# Patient Record
Sex: FEMALE | Race: WHITE | NOT HISPANIC OR LATINO | Employment: OTHER | URBAN - NONMETROPOLITAN AREA
[De-identification: names, ages, dates, MRNs, and addresses within clinical notes are randomized per-mention and may not be internally consistent; named-entity substitution may affect disease eponyms.]

---

## 2021-01-07 ENCOUNTER — LAB VISIT (OUTPATIENT)
Dept: LAB | Facility: HOSPITAL | Age: 71
End: 2021-01-07
Attending: SURGERY
Payer: MEDICARE

## 2021-01-07 DIAGNOSIS — T81.43XA POSTOPERATIVE INTRA-ABDOMINAL ABSCESS: Primary | ICD-10-CM

## 2021-01-07 DIAGNOSIS — I70.213 ATHEROSCLEROSIS OF NATIVE ARTERY OF BOTH LOWER EXTREMITIES WITH INTERMITTENT CLAUDICATION: ICD-10-CM

## 2021-01-07 DIAGNOSIS — G83.9 PARESIS: ICD-10-CM

## 2021-01-07 LAB
ANION GAP SERPL CALC-SCNC: 7 MMOL/L (ref 8–16)
BUN SERPL-MCNC: 8 MG/DL (ref 8–23)
CALCIUM SERPL-MCNC: 7.5 MG/DL (ref 8.7–10.5)
CHLORIDE SERPL-SCNC: 101 MMOL/L (ref 95–110)
CO2 SERPL-SCNC: 27 MMOL/L (ref 23–29)
CREAT SERPL-MCNC: 0.4 MG/DL (ref 0.5–1.4)
EST. GFR  (AFRICAN AMERICAN): >60 ML/MIN/1.73 M^2
EST. GFR  (NON AFRICAN AMERICAN): >60 ML/MIN/1.73 M^2
GLUCOSE SERPL-MCNC: 118 MG/DL (ref 70–110)
POTASSIUM SERPL-SCNC: 3.5 MMOL/L (ref 3.5–5.1)
SODIUM SERPL-SCNC: 135 MMOL/L (ref 136–145)

## 2021-01-07 PROCEDURE — 36415 COLL VENOUS BLD VENIPUNCTURE: CPT

## 2021-01-07 PROCEDURE — 80048 BASIC METABOLIC PNL TOTAL CA: CPT

## 2021-05-27 ENCOUNTER — LAB VISIT (OUTPATIENT)
Dept: LAB | Facility: HOSPITAL | Age: 71
End: 2021-05-27
Attending: SURGERY
Payer: MEDICARE

## 2021-05-27 DIAGNOSIS — R19.7 DIARRHEA: Primary | ICD-10-CM

## 2021-05-27 PROCEDURE — 87324 CLOSTRIDIUM AG IA: CPT | Performed by: SURGERY

## 2021-05-27 PROCEDURE — 87449 NOS EACH ORGANISM AG IA: CPT | Performed by: SURGERY

## 2021-05-28 LAB
C DIFF GDH STL QL: NEGATIVE
C DIFF TOX A+B STL QL IA: NEGATIVE

## 2021-06-14 ENCOUNTER — OFFICE VISIT (OUTPATIENT)
Dept: WOUND CARE | Facility: HOSPITAL | Age: 71
End: 2021-06-14
Attending: SURGERY
Payer: MEDICARE

## 2021-06-14 VITALS
DIASTOLIC BLOOD PRESSURE: 54 MMHG | HEART RATE: 84 BPM | SYSTOLIC BLOOD PRESSURE: 101 MMHG | TEMPERATURE: 100 F | RESPIRATION RATE: 20 BRPM

## 2021-06-14 DIAGNOSIS — L89.890: ICD-10-CM

## 2021-06-14 DIAGNOSIS — L89.890 DECUBITUS ULCER OF LEFT LEG, UNSTAGEABLE: ICD-10-CM

## 2021-06-14 DIAGNOSIS — L89.610 DECUBITUS ULCER OF RIGHT HEEL, UNSTAGEABLE: ICD-10-CM

## 2021-06-14 DIAGNOSIS — L89.893: ICD-10-CM

## 2021-06-14 DIAGNOSIS — L89.324 DECUBITUS ULCER OF ISCHIUM, LEFT, STAGE IV: ICD-10-CM

## 2021-06-14 DIAGNOSIS — L89.314 DECUBITUS ULCER OF RIGHT ISCHIAL AREA, STAGE IV: ICD-10-CM

## 2021-06-14 DIAGNOSIS — L89.154 SACRAL DECUBITUS ULCER, STAGE IV: ICD-10-CM

## 2021-06-14 DIAGNOSIS — L89.620 DECUBITUS ULCER OF LEFT HEEL, UNSTAGEABLE: ICD-10-CM

## 2021-06-14 PROBLEM — L97.529: Status: ACTIVE | Noted: 2021-06-14

## 2021-06-14 PROCEDURE — 11043 DBRDMT MUSC&/FSCA 1ST 20/<: CPT

## 2021-06-14 PROCEDURE — 27201912 HC WOUND CARE DEBRIDEMENT SUPPLIES

## 2021-06-14 PROCEDURE — 11042 DBRDMT SUBQ TIS 1ST 20SQCM/<: CPT

## 2021-06-14 PROCEDURE — 11045 DBRDMT SUBQ TISS EACH ADDL: CPT

## 2021-06-14 PROCEDURE — 99499 NO LOS: ICD-10-PCS | Mod: ,,, | Performed by: SURGERY

## 2021-06-14 PROCEDURE — 11046 DBRDMT MUSC&/FSCA EA ADDL: CPT

## 2021-06-14 PROCEDURE — 99499 UNLISTED E&M SERVICE: CPT | Mod: ,,, | Performed by: SURGERY

## 2021-06-14 PROCEDURE — 99204 OFFICE O/P NEW MOD 45 MIN: CPT | Mod: 25

## 2021-06-14 RX ORDER — ALPRAZOLAM 0.25 MG/1
TABLET ORAL 3 TIMES DAILY
COMMUNITY

## 2021-06-14 RX ORDER — ATORVASTATIN CALCIUM 40 MG/1
40 TABLET, FILM COATED ORAL DAILY
COMMUNITY

## 2021-06-14 RX ORDER — CLOPIDOGREL BISULFATE 75 MG/1
75 TABLET ORAL DAILY
COMMUNITY

## 2021-06-14 RX ORDER — ASPIRIN 81 MG/1
81 TABLET ORAL DAILY
COMMUNITY

## 2021-06-14 RX ORDER — HYOSCYAMINE SULFATE 0.12 MG/1
0.12 TABLET SUBLINGUAL EVERY 4 HOURS PRN
COMMUNITY

## 2021-06-14 RX ORDER — HYDROMORPHONE HYDROCHLORIDE 2 MG/1
2 TABLET ORAL EVERY 4 HOURS PRN
COMMUNITY

## 2021-06-14 RX ORDER — HYDROCODONE BITARTRATE AND ACETAMINOPHEN 7.5; 325 MG/1; MG/1
1 TABLET ORAL EVERY 6 HOURS PRN
COMMUNITY

## 2021-06-14 RX ORDER — PANTOPRAZOLE SODIUM 40 MG/1
40 TABLET, DELAYED RELEASE ORAL DAILY
COMMUNITY

## 2021-06-14 RX ORDER — METHOCARBAMOL 750 MG/1
500 TABLET, FILM COATED ORAL 4 TIMES DAILY
COMMUNITY

## 2021-06-14 RX ORDER — ASCORBIC ACID 500 MG
500 TABLET ORAL 2 TIMES DAILY
COMMUNITY

## 2021-06-14 RX ORDER — GABAPENTIN 300 MG/1
300 CAPSULE ORAL 3 TIMES DAILY
COMMUNITY

## 2021-06-14 RX ORDER — METOPROLOL SUCCINATE 25 MG/1
25 TABLET, EXTENDED RELEASE ORAL DAILY
COMMUNITY

## 2021-06-14 RX ORDER — CILOSTAZOL 100 MG/1
50 TABLET ORAL 2 TIMES DAILY
COMMUNITY

## 2021-06-28 ENCOUNTER — OFFICE VISIT (OUTPATIENT)
Dept: WOUND CARE | Facility: HOSPITAL | Age: 71
End: 2021-06-28
Attending: SURGERY
Payer: MEDICARE

## 2021-06-28 VITALS
HEART RATE: 99 BPM | SYSTOLIC BLOOD PRESSURE: 111 MMHG | RESPIRATION RATE: 18 BRPM | TEMPERATURE: 98 F | DIASTOLIC BLOOD PRESSURE: 54 MMHG

## 2021-06-28 DIAGNOSIS — L89.890: ICD-10-CM

## 2021-06-28 DIAGNOSIS — L89.324 DECUBITUS ULCER OF ISCHIUM, LEFT, STAGE IV: ICD-10-CM

## 2021-06-28 DIAGNOSIS — L89.620 DECUBITUS ULCER OF LEFT HEEL, UNSTAGEABLE: ICD-10-CM

## 2021-06-28 DIAGNOSIS — L89.314 DECUBITUS ULCER OF RIGHT ISCHIAL AREA, STAGE IV: ICD-10-CM

## 2021-06-28 DIAGNOSIS — L89.893: ICD-10-CM

## 2021-06-28 DIAGNOSIS — L89.154 SACRAL DECUBITUS ULCER, STAGE IV: ICD-10-CM

## 2021-06-28 DIAGNOSIS — L89.890 DECUBITUS ULCER OF LEFT LEG, UNSTAGEABLE: ICD-10-CM

## 2021-06-28 PROCEDURE — 99499 NO LOS: ICD-10-PCS | Mod: ,,, | Performed by: SURGERY

## 2021-06-28 PROCEDURE — 11042 DBRDMT SUBQ TIS 1ST 20SQCM/<: CPT

## 2021-06-28 PROCEDURE — 99499 UNLISTED E&M SERVICE: CPT | Mod: ,,, | Performed by: SURGERY

## 2021-06-28 PROCEDURE — 11043 DBRDMT MUSC&/FSCA 1ST 20/<: CPT

## 2021-06-28 PROCEDURE — 11046 DBRDMT MUSC&/FSCA EA ADDL: CPT

## 2021-06-28 PROCEDURE — 27201912 HC WOUND CARE DEBRIDEMENT SUPPLIES

## 2021-07-12 ENCOUNTER — OFFICE VISIT (OUTPATIENT)
Dept: WOUND CARE | Facility: HOSPITAL | Age: 71
End: 2021-07-12
Attending: SURGERY
Payer: MEDICARE

## 2021-07-12 VITALS
TEMPERATURE: 98 F | HEART RATE: 72 BPM | SYSTOLIC BLOOD PRESSURE: 105 MMHG | RESPIRATION RATE: 18 BRPM | DIASTOLIC BLOOD PRESSURE: 50 MMHG

## 2021-07-12 DIAGNOSIS — L89.890: ICD-10-CM

## 2021-07-12 DIAGNOSIS — L89.314 DECUBITUS ULCER OF RIGHT ISCHIAL AREA, STAGE IV: ICD-10-CM

## 2021-07-12 DIAGNOSIS — L89.620 DECUBITUS ULCER OF LEFT HEEL, UNSTAGEABLE: ICD-10-CM

## 2021-07-12 DIAGNOSIS — E43 SEVERE PROTEIN-CALORIE MALNUTRITION: ICD-10-CM

## 2021-07-12 DIAGNOSIS — L89.894: ICD-10-CM

## 2021-07-12 DIAGNOSIS — L89.610 DECUBITUS ULCER OF RIGHT HEEL, UNSTAGEABLE: ICD-10-CM

## 2021-07-12 DIAGNOSIS — L89.154 SACRAL DECUBITUS ULCER, STAGE IV: ICD-10-CM

## 2021-07-12 DIAGNOSIS — L89.324 DECUBITUS ULCER OF ISCHIUM, LEFT, STAGE IV: ICD-10-CM

## 2021-07-12 PROCEDURE — 11043 DBRDMT MUSC&/FSCA 1ST 20/<: CPT

## 2021-07-12 PROCEDURE — 99499 UNLISTED E&M SERVICE: CPT | Mod: ,,, | Performed by: SURGERY

## 2021-07-12 PROCEDURE — 11046 DBRDMT MUSC&/FSCA EA ADDL: CPT

## 2021-07-12 PROCEDURE — 11044 DBRDMT BONE 1ST 20 SQ CM/<: CPT

## 2021-07-12 PROCEDURE — 99499 NO LOS: ICD-10-PCS | Mod: ,,, | Performed by: SURGERY

## 2021-07-12 PROCEDURE — 27201912 HC WOUND CARE DEBRIDEMENT SUPPLIES

## 2021-07-26 ENCOUNTER — OFFICE VISIT (OUTPATIENT)
Dept: WOUND CARE | Facility: HOSPITAL | Age: 71
End: 2021-07-26
Attending: SURGERY
Payer: MEDICARE

## 2021-07-26 VITALS
SYSTOLIC BLOOD PRESSURE: 120 MMHG | TEMPERATURE: 98 F | DIASTOLIC BLOOD PRESSURE: 68 MMHG | HEART RATE: 76 BPM | RESPIRATION RATE: 18 BRPM

## 2021-07-26 DIAGNOSIS — L89.624 STAGE IV PRESSURE ULCER OF LEFT HEEL: ICD-10-CM

## 2021-07-26 DIAGNOSIS — E43 SEVERE PROTEIN-CALORIE MALNUTRITION: ICD-10-CM

## 2021-07-26 DIAGNOSIS — L89.894: ICD-10-CM

## 2021-07-26 DIAGNOSIS — L89.610 DECUBITUS ULCER OF RIGHT HEEL, UNSTAGEABLE: ICD-10-CM

## 2021-07-26 DIAGNOSIS — L89.154 SACRAL DECUBITUS ULCER, STAGE IV: ICD-10-CM

## 2021-07-26 DIAGNOSIS — L89.314 DECUBITUS ULCER OF RIGHT ISCHIAL AREA, STAGE IV: ICD-10-CM

## 2021-07-26 DIAGNOSIS — R10.9 RIGHT SIDED ABDOMINAL PAIN: ICD-10-CM

## 2021-07-26 DIAGNOSIS — L89.324 DECUBITUS ULCER OF ISCHIUM, LEFT, STAGE IV: ICD-10-CM

## 2021-07-26 PROBLEM — L89.890: Status: RESOLVED | Noted: 2021-06-14 | Resolved: 2021-07-26

## 2021-07-26 PROCEDURE — 27201912 HC WOUND CARE DEBRIDEMENT SUPPLIES

## 2021-07-26 PROCEDURE — 11043 DBRDMT MUSC&/FSCA 1ST 20/<: CPT

## 2021-07-26 PROCEDURE — 99499 NO LOS: ICD-10-PCS | Mod: ,,, | Performed by: SURGERY

## 2021-07-26 PROCEDURE — 11046 DBRDMT MUSC&/FSCA EA ADDL: CPT

## 2021-07-26 PROCEDURE — 11042 DBRDMT SUBQ TIS 1ST 20SQCM/<: CPT

## 2021-07-26 PROCEDURE — 99499 UNLISTED E&M SERVICE: CPT | Mod: ,,, | Performed by: SURGERY

## 2021-08-09 ENCOUNTER — OFFICE VISIT (OUTPATIENT)
Dept: WOUND CARE | Facility: HOSPITAL | Age: 71
End: 2021-08-09
Attending: SURGERY
Payer: MEDICARE

## 2021-08-09 VITALS
DIASTOLIC BLOOD PRESSURE: 73 MMHG | RESPIRATION RATE: 20 BRPM | HEART RATE: 79 BPM | TEMPERATURE: 99 F | SYSTOLIC BLOOD PRESSURE: 118 MMHG

## 2021-08-09 DIAGNOSIS — L89.314 DECUBITUS ULCER OF RIGHT ISCHIAL AREA, STAGE IV: ICD-10-CM

## 2021-08-09 DIAGNOSIS — L89.894: ICD-10-CM

## 2021-08-09 DIAGNOSIS — L89.624 STAGE IV PRESSURE ULCER OF LEFT HEEL: ICD-10-CM

## 2021-08-09 DIAGNOSIS — G89.29 CHRONIC BACK PAIN GREATER THAN 3 MONTHS DURATION: ICD-10-CM

## 2021-08-09 DIAGNOSIS — L89.613 STAGE III PRESSURE ULCER OF RIGHT HEEL: ICD-10-CM

## 2021-08-09 DIAGNOSIS — R10.9 RIGHT SIDED ABDOMINAL PAIN: ICD-10-CM

## 2021-08-09 DIAGNOSIS — L89.154 SACRAL DECUBITUS ULCER, STAGE IV: ICD-10-CM

## 2021-08-09 DIAGNOSIS — L02.411 CUTANEOUS ABSCESS OF RIGHT AXILLA: ICD-10-CM

## 2021-08-09 DIAGNOSIS — M54.9 CHRONIC BACK PAIN GREATER THAN 3 MONTHS DURATION: ICD-10-CM

## 2021-08-09 DIAGNOSIS — L89.324 DECUBITUS ULCER OF ISCHIUM, LEFT, STAGE IV: ICD-10-CM

## 2021-08-09 PROCEDURE — 11042 DBRDMT SUBQ TIS 1ST 20SQCM/<: CPT

## 2021-08-09 PROCEDURE — 99499 UNLISTED E&M SERVICE: CPT | Mod: ,,, | Performed by: SURGERY

## 2021-08-09 PROCEDURE — 99215 OFFICE O/P EST HI 40 MIN: CPT

## 2021-08-09 PROCEDURE — 11045 DBRDMT SUBQ TISS EACH ADDL: CPT

## 2021-08-09 PROCEDURE — 11046 DBRDMT MUSC&/FSCA EA ADDL: CPT

## 2021-08-09 PROCEDURE — 10061 I&D ABSCESS COMP/MULTIPLE: CPT

## 2021-08-09 PROCEDURE — 27201912 HC WOUND CARE DEBRIDEMENT SUPPLIES

## 2021-08-09 PROCEDURE — 11043 DBRDMT MUSC&/FSCA 1ST 20/<: CPT

## 2021-08-09 PROCEDURE — 99499 NO LOS: ICD-10-PCS | Mod: ,,, | Performed by: SURGERY

## 2021-09-27 ENCOUNTER — OFFICE VISIT (OUTPATIENT)
Dept: WOUND CARE | Facility: HOSPITAL | Age: 71
End: 2021-09-27
Attending: SURGERY
Payer: MEDICARE

## 2021-09-27 ENCOUNTER — HOSPITAL ENCOUNTER (OUTPATIENT)
Dept: RADIOLOGY | Facility: HOSPITAL | Age: 71
Discharge: HOME | End: 2021-09-27
Attending: SURGERY
Payer: MEDICARE

## 2021-09-27 VITALS
HEART RATE: 87 BPM | DIASTOLIC BLOOD PRESSURE: 74 MMHG | TEMPERATURE: 98 F | RESPIRATION RATE: 20 BRPM | SYSTOLIC BLOOD PRESSURE: 142 MMHG

## 2021-09-27 DIAGNOSIS — L89.324 DECUBITUS ULCER OF ISCHIUM, LEFT, STAGE IV: ICD-10-CM

## 2021-09-27 DIAGNOSIS — L89.894: ICD-10-CM

## 2021-09-27 DIAGNOSIS — G89.29 CHRONIC BACK PAIN GREATER THAN 3 MONTHS DURATION: ICD-10-CM

## 2021-09-27 DIAGNOSIS — L89.154 SACRAL DECUBITUS ULCER, STAGE IV: ICD-10-CM

## 2021-09-27 DIAGNOSIS — L89.314 DECUBITUS ULCER OF RIGHT ISCHIAL AREA, STAGE IV: ICD-10-CM

## 2021-09-27 DIAGNOSIS — R10.9 RIGHT SIDED ABDOMINAL PAIN: ICD-10-CM

## 2021-09-27 DIAGNOSIS — E43 SEVERE PROTEIN-CALORIE MALNUTRITION: ICD-10-CM

## 2021-09-27 DIAGNOSIS — M54.9 CHRONIC BACK PAIN GREATER THAN 3 MONTHS DURATION: ICD-10-CM

## 2021-09-27 DIAGNOSIS — L89.613 STAGE III PRESSURE ULCER OF RIGHT HEEL: ICD-10-CM

## 2021-09-27 DIAGNOSIS — L89.624 STAGE IV PRESSURE ULCER OF LEFT HEEL: ICD-10-CM

## 2021-09-27 PROBLEM — L02.411 CUTANEOUS ABSCESS OF RIGHT AXILLA: Status: RESOLVED | Noted: 2021-08-09 | Resolved: 2021-09-27

## 2021-09-27 PROCEDURE — 74177 CT ABDOMEN PELVIS WITH CONTRAST: ICD-10-PCS | Mod: 26,,, | Performed by: RADIOLOGY

## 2021-09-27 PROCEDURE — 99499 NO LOS: ICD-10-PCS | Mod: ,,, | Performed by: SURGERY

## 2021-09-27 PROCEDURE — 74177 CT ABD & PELVIS W/CONTRAST: CPT | Mod: TC

## 2021-09-27 PROCEDURE — 99214 OFFICE O/P EST MOD 30 MIN: CPT | Mod: 25

## 2021-09-27 PROCEDURE — 11045 DBRDMT SUBQ TISS EACH ADDL: CPT

## 2021-09-27 PROCEDURE — 11042 DBRDMT SUBQ TIS 1ST 20SQCM/<: CPT

## 2021-09-27 PROCEDURE — 74177 CT ABD & PELVIS W/CONTRAST: CPT | Mod: 26,,, | Performed by: RADIOLOGY

## 2021-09-27 PROCEDURE — 27201912 HC WOUND CARE DEBRIDEMENT SUPPLIES

## 2021-09-27 PROCEDURE — 99499 UNLISTED E&M SERVICE: CPT | Mod: ,,, | Performed by: SURGERY

## 2021-09-27 PROCEDURE — 25500020 PHARM REV CODE 255: Performed by: SURGERY

## 2021-09-27 RX ADMIN — IOHEXOL 75 ML: 350 INJECTION, SOLUTION INTRAVENOUS at 08:09

## 2021-10-11 ENCOUNTER — OFFICE VISIT (OUTPATIENT)
Dept: WOUND CARE | Facility: HOSPITAL | Age: 71
End: 2021-10-11
Attending: SURGERY
Payer: MEDICARE

## 2021-10-11 ENCOUNTER — HOSPITAL ENCOUNTER (OUTPATIENT)
Dept: RADIOLOGY | Facility: HOSPITAL | Age: 71
Discharge: HOME | End: 2021-10-11
Attending: SURGERY
Payer: MEDICARE

## 2021-10-11 VITALS
RESPIRATION RATE: 18 BRPM | HEART RATE: 72 BPM | SYSTOLIC BLOOD PRESSURE: 132 MMHG | TEMPERATURE: 98 F | DIASTOLIC BLOOD PRESSURE: 67 MMHG

## 2021-10-11 DIAGNOSIS — R93.6 ABNORMAL FINDING ON DIAGNOSTIC IMAGING OF EXTREMITY: ICD-10-CM

## 2021-10-11 DIAGNOSIS — I82.511 CHRONIC EMBOLISM AND THROMBOSIS OF RIGHT FEMORAL VEIN: ICD-10-CM

## 2021-10-11 DIAGNOSIS — L89.894: ICD-10-CM

## 2021-10-11 DIAGNOSIS — L89.893 PRESSURE ULCER OF TOE OF LEFT FOOT, STAGE 3: ICD-10-CM

## 2021-10-11 DIAGNOSIS — L89.314 DECUBITUS ULCER OF RIGHT ISCHIAL AREA, STAGE IV: ICD-10-CM

## 2021-10-11 DIAGNOSIS — R93.89 ABNORMAL FINDINGS ON DIAGNOSTIC IMAGING OF CARDIOVASCULAR SYSTEM: ICD-10-CM

## 2021-10-11 DIAGNOSIS — L89.324 DECUBITUS ULCER OF ISCHIUM, LEFT, STAGE IV: ICD-10-CM

## 2021-10-11 DIAGNOSIS — L89.624 STAGE IV PRESSURE ULCER OF LEFT HEEL: ICD-10-CM

## 2021-10-11 DIAGNOSIS — R10.9 RIGHT SIDED ABDOMINAL PAIN: ICD-10-CM

## 2021-10-11 DIAGNOSIS — L89.154 SACRAL DECUBITUS ULCER, STAGE IV: ICD-10-CM

## 2021-10-11 PROBLEM — L89.613 STAGE III PRESSURE ULCER OF RIGHT HEEL: Status: RESOLVED | Noted: 2021-06-14 | Resolved: 2021-10-11

## 2021-10-11 PROCEDURE — 11043 DBRDMT MUSC&/FSCA 1ST 20/<: CPT

## 2021-10-11 PROCEDURE — 99499 NO LOS: ICD-10-PCS | Mod: ,,, | Performed by: SURGERY

## 2021-10-11 PROCEDURE — 93971 EXTREMITY STUDY: CPT | Mod: 26,RT,, | Performed by: RADIOLOGY

## 2021-10-11 PROCEDURE — 11045 DBRDMT SUBQ TISS EACH ADDL: CPT

## 2021-10-11 PROCEDURE — 93971 US LOWER EXTREMITY VEINS RIGHT: ICD-10-PCS | Mod: 26,RT,, | Performed by: RADIOLOGY

## 2021-10-11 PROCEDURE — 93971 EXTREMITY STUDY: CPT | Mod: TC,RT

## 2021-10-11 PROCEDURE — 27201912 HC WOUND CARE DEBRIDEMENT SUPPLIES

## 2021-10-11 PROCEDURE — 99499 UNLISTED E&M SERVICE: CPT | Mod: ,,, | Performed by: SURGERY

## 2021-10-11 PROCEDURE — 11042 DBRDMT SUBQ TIS 1ST 20SQCM/<: CPT

## 2021-10-25 ENCOUNTER — OFFICE VISIT (OUTPATIENT)
Dept: WOUND CARE | Facility: HOSPITAL | Age: 71
End: 2021-10-25
Attending: SURGERY
Payer: MEDICARE

## 2021-10-25 VITALS
HEART RATE: 64 BPM | TEMPERATURE: 98 F | DIASTOLIC BLOOD PRESSURE: 80 MMHG | SYSTOLIC BLOOD PRESSURE: 120 MMHG | RESPIRATION RATE: 18 BRPM

## 2021-10-25 DIAGNOSIS — R93.6 ABNORMAL FINDING ON DIAGNOSTIC IMAGING OF EXTREMITY: ICD-10-CM

## 2021-10-25 DIAGNOSIS — L89.324 DECUBITUS ULCER OF ISCHIUM, LEFT, STAGE IV: ICD-10-CM

## 2021-10-25 DIAGNOSIS — L89.154 SACRAL DECUBITUS ULCER, STAGE IV: ICD-10-CM

## 2021-10-25 DIAGNOSIS — R93.89 ABNORMAL FINDINGS ON DIAGNOSTIC IMAGING OF CARDIOVASCULAR SYSTEM: ICD-10-CM

## 2021-10-25 DIAGNOSIS — L89.893 PRESSURE ULCER OF TOE OF LEFT FOOT, STAGE 3: ICD-10-CM

## 2021-10-25 DIAGNOSIS — R10.9 RIGHT SIDED ABDOMINAL PAIN: ICD-10-CM

## 2021-10-25 DIAGNOSIS — L89.314 DECUBITUS ULCER OF RIGHT ISCHIAL AREA, STAGE IV: ICD-10-CM

## 2021-10-25 DIAGNOSIS — L89.894: ICD-10-CM

## 2021-10-25 PROCEDURE — 11045 DBRDMT SUBQ TISS EACH ADDL: CPT

## 2021-10-25 PROCEDURE — 11042 DBRDMT SUBQ TIS 1ST 20SQCM/<: CPT

## 2021-10-25 PROCEDURE — 99499 UNLISTED E&M SERVICE: CPT | Mod: ,,, | Performed by: SURGERY

## 2021-10-25 PROCEDURE — 27201912 HC WOUND CARE DEBRIDEMENT SUPPLIES

## 2021-10-25 PROCEDURE — 99499 NO LOS: ICD-10-PCS | Mod: ,,, | Performed by: SURGERY

## 2021-11-08 ENCOUNTER — OFFICE VISIT (OUTPATIENT)
Dept: WOUND CARE | Facility: HOSPITAL | Age: 71
End: 2021-11-08
Attending: SURGERY
Payer: MEDICARE

## 2021-11-08 VITALS
RESPIRATION RATE: 20 BRPM | SYSTOLIC BLOOD PRESSURE: 110 MMHG | DIASTOLIC BLOOD PRESSURE: 50 MMHG | HEART RATE: 58 BPM | TEMPERATURE: 98 F

## 2021-11-08 DIAGNOSIS — L89.154 SACRAL DECUBITUS ULCER, STAGE IV: ICD-10-CM

## 2021-11-08 DIAGNOSIS — L89.894: ICD-10-CM

## 2021-11-08 DIAGNOSIS — L89.314 DECUBITUS ULCER OF RIGHT ISCHIAL AREA, STAGE IV: ICD-10-CM

## 2021-11-08 DIAGNOSIS — L89.324 DECUBITUS ULCER OF ISCHIUM, LEFT, STAGE IV: ICD-10-CM

## 2021-11-08 DIAGNOSIS — L89.893 PRESSURE ULCER OF TOE OF LEFT FOOT, STAGE 3: ICD-10-CM

## 2021-11-08 DIAGNOSIS — G82.21 CHRONIC COMPLETE FLACCID PARAPLEGIA: ICD-10-CM

## 2021-11-08 PROBLEM — L89.624 STAGE IV PRESSURE ULCER OF LEFT HEEL: Status: RESOLVED | Noted: 2021-06-14 | Resolved: 2021-11-08

## 2021-11-08 PROCEDURE — 11042 DBRDMT SUBQ TIS 1ST 20SQCM/<: CPT

## 2021-11-08 PROCEDURE — 99499 UNLISTED E&M SERVICE: CPT | Mod: ,,, | Performed by: SURGERY

## 2021-11-08 PROCEDURE — 99499 NO LOS: ICD-10-PCS | Mod: ,,, | Performed by: SURGERY

## 2021-11-08 PROCEDURE — 27201912 HC WOUND CARE DEBRIDEMENT SUPPLIES

## 2021-11-08 PROCEDURE — 11045 DBRDMT SUBQ TISS EACH ADDL: CPT

## 2021-11-22 ENCOUNTER — OFFICE VISIT (OUTPATIENT)
Dept: WOUND CARE | Facility: HOSPITAL | Age: 71
End: 2021-11-22
Attending: SURGERY
Payer: MEDICARE

## 2021-11-22 VITALS
TEMPERATURE: 97 F | RESPIRATION RATE: 20 BRPM | SYSTOLIC BLOOD PRESSURE: 147 MMHG | DIASTOLIC BLOOD PRESSURE: 85 MMHG | HEART RATE: 84 BPM

## 2021-11-22 DIAGNOSIS — L89.893 PRESSURE ULCER OF TOE OF LEFT FOOT, STAGE 3: ICD-10-CM

## 2021-11-22 DIAGNOSIS — L89.154 SACRAL DECUBITUS ULCER, STAGE IV: Primary | ICD-10-CM

## 2021-11-22 DIAGNOSIS — L89.314 DECUBITUS ULCER OF RIGHT ISCHIAL AREA, STAGE IV: ICD-10-CM

## 2021-11-22 DIAGNOSIS — L89.324 DECUBITUS ULCER OF ISCHIUM, LEFT, STAGE IV: ICD-10-CM

## 2021-11-22 DIAGNOSIS — G82.21 CHRONIC COMPLETE FLACCID PARAPLEGIA: ICD-10-CM

## 2021-11-22 DIAGNOSIS — L89.894: ICD-10-CM

## 2021-11-22 PROBLEM — R93.6 ABNORMAL FINDING ON DIAGNOSTIC IMAGING OF EXTREMITY: Status: RESOLVED | Noted: 2021-10-11 | Resolved: 2021-11-22

## 2021-11-22 PROBLEM — E43 SEVERE PROTEIN-CALORIE MALNUTRITION: Status: RESOLVED | Noted: 2021-07-12 | Resolved: 2021-11-22

## 2021-11-22 PROBLEM — R93.89 ABNORMAL FINDINGS ON DIAGNOSTIC IMAGING OF CARDIOVASCULAR SYSTEM: Status: RESOLVED | Noted: 2021-10-11 | Resolved: 2021-11-22

## 2021-11-22 PROCEDURE — 11045 DBRDMT SUBQ TISS EACH ADDL: CPT

## 2021-11-22 PROCEDURE — 99499 NO LOS: ICD-10-PCS | Mod: ,,, | Performed by: SURGERY

## 2021-11-22 PROCEDURE — 99499 UNLISTED E&M SERVICE: CPT | Mod: ,,, | Performed by: SURGERY

## 2021-11-22 PROCEDURE — 27201912 HC WOUND CARE DEBRIDEMENT SUPPLIES

## 2021-11-22 PROCEDURE — 11042 DBRDMT SUBQ TIS 1ST 20SQCM/<: CPT

## 2021-12-06 ENCOUNTER — OFFICE VISIT (OUTPATIENT)
Dept: WOUND CARE | Facility: HOSPITAL | Age: 71
End: 2021-12-06
Attending: SURGERY
Payer: MEDICARE

## 2021-12-06 VITALS
HEART RATE: 70 BPM | TEMPERATURE: 98 F | SYSTOLIC BLOOD PRESSURE: 132 MMHG | DIASTOLIC BLOOD PRESSURE: 59 MMHG | RESPIRATION RATE: 17 BRPM

## 2021-12-06 DIAGNOSIS — L89.894: ICD-10-CM

## 2021-12-06 DIAGNOSIS — L89.893 PRESSURE ULCER OF TOE OF LEFT FOOT, STAGE 3: ICD-10-CM

## 2021-12-06 DIAGNOSIS — L89.324 DECUBITUS ULCER OF ISCHIUM, LEFT, STAGE IV: ICD-10-CM

## 2021-12-06 DIAGNOSIS — L89.154 SACRAL DECUBITUS ULCER, STAGE IV: ICD-10-CM

## 2021-12-06 DIAGNOSIS — G82.21 CHRONIC COMPLETE FLACCID PARAPLEGIA: Primary | ICD-10-CM

## 2021-12-06 DIAGNOSIS — L89.314 DECUBITUS ULCER OF RIGHT ISCHIAL AREA, STAGE IV: ICD-10-CM

## 2021-12-06 PROCEDURE — 99499 NO LOS: ICD-10-PCS | Mod: ,,, | Performed by: SURGERY

## 2021-12-06 PROCEDURE — 11042 DBRDMT SUBQ TIS 1ST 20SQCM/<: CPT

## 2021-12-06 PROCEDURE — 11045 DBRDMT SUBQ TISS EACH ADDL: CPT

## 2021-12-06 PROCEDURE — 99499 UNLISTED E&M SERVICE: CPT | Mod: ,,, | Performed by: SURGERY

## 2021-12-06 PROCEDURE — 27201912 HC WOUND CARE DEBRIDEMENT SUPPLIES

## 2022-01-31 ENCOUNTER — OFFICE VISIT (OUTPATIENT)
Dept: WOUND CARE | Facility: HOSPITAL | Age: 72
End: 2022-01-31
Attending: SURGERY
Payer: MEDICARE

## 2022-01-31 VITALS
HEART RATE: 69 BPM | TEMPERATURE: 98 F | SYSTOLIC BLOOD PRESSURE: 116 MMHG | DIASTOLIC BLOOD PRESSURE: 78 MMHG | RESPIRATION RATE: 18 BRPM

## 2022-01-31 DIAGNOSIS — R10.9 RIGHT SIDED ABDOMINAL PAIN: ICD-10-CM

## 2022-01-31 DIAGNOSIS — L89.154 SACRAL DECUBITUS ULCER, STAGE IV: ICD-10-CM

## 2022-01-31 DIAGNOSIS — G82.21 CHRONIC COMPLETE FLACCID PARAPLEGIA: Primary | ICD-10-CM

## 2022-01-31 DIAGNOSIS — L89.894: ICD-10-CM

## 2022-01-31 DIAGNOSIS — L89.324 DECUBITUS ULCER OF ISCHIUM, LEFT, STAGE IV: ICD-10-CM

## 2022-01-31 DIAGNOSIS — L89.893 PRESSURE ULCER OF TOE OF LEFT FOOT, STAGE 3: ICD-10-CM

## 2022-01-31 DIAGNOSIS — L89.314 DECUBITUS ULCER OF RIGHT ISCHIAL AREA, STAGE IV: ICD-10-CM

## 2022-01-31 PROCEDURE — 99499 NO LOS: ICD-10-PCS | Mod: ,,, | Performed by: SURGERY

## 2022-01-31 PROCEDURE — 11045 DBRDMT SUBQ TISS EACH ADDL: CPT

## 2022-01-31 PROCEDURE — 27201912 HC WOUND CARE DEBRIDEMENT SUPPLIES

## 2022-01-31 PROCEDURE — 99213 OFFICE O/P EST LOW 20 MIN: CPT

## 2022-01-31 PROCEDURE — 99499 UNLISTED E&M SERVICE: CPT | Mod: ,,, | Performed by: SURGERY

## 2022-01-31 PROCEDURE — 11042 DBRDMT SUBQ TIS 1ST 20SQCM/<: CPT

## 2022-01-31 NOTE — ASSESSMENT & PLAN NOTE
Patient indicates she is sitting on occasion particularly when going to a couple doctor's visits.  Wound is improved.  Continue debridement to maintain active phase wound healing.  Continue offloading with low loss air mattress.

## 2022-01-31 NOTE — ASSESSMENT & PLAN NOTE
Wound is improved.  Continue debridement to remove nonviable tissue and maintain active phase wound healing.  Continue offloading with low loss air mattress.

## 2022-01-31 NOTE — PROGRESS NOTES
[facility]  Wound Care  Progress Note    Problem List Items Addressed This Visit        Neuro    Chronic complete flaccid paraplegia - Primary    Overview     He patient sustained spinal cord infarct resulting and chronic bilateral lower extremity paraplegia.            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  Patient has been bed ridden with prolonged hospital course requiring multiple surgeries.  Patient with numerous pressure ulcers.  Patient recently discharged and receiving home health.  Patient has low loss air mattress at home. Patient says her appetite has been good.           Current Assessment & Plan     Patient complains of pain in her low back and even with light touch of the skin on the left side of her wound.  This is suggestive of neuropathic pain.  Wound continues to make steady progress.  There is no evidence of pressure.  Continue debridement to maintain active phase wound healing.  Continue offloading with low loss air mattress.         Decubitus ulcer of right ischial area, stage IV    Overview     Wound is now healed as of 01/31/2022.         Current Assessment & Plan     Wound is now healed as of 01/31/2022.  Continue offloading with low loss air mattress.           Decubitus ulcer of ischium, left, stage IV    Current Assessment & Plan     Patient indicates she is sitting on occasion particularly when going to a couple doctor's visits.  Wound is improved.  Continue debridement to maintain active phase wound healing.  Continue offloading with low loss air mattress.         Decubitus ulcer of right lower extremity, stage IV    Overview     Muscle was exposed on initial evaluation.         Current Assessment & Plan     Wound is improved.  Continue debridement to remove nonviable tissue and maintain active phase wound healing.  Continue offloading with low loss air  mattress.         Pressure ulcer of toe of left foot, stage 3    Overview     Patient with longstanding pressure ulcer of the left great toe unable to be staged.  Eschar has been removed to reveal subcutaneous tissue in a stage III ulcer.  Wound appears resolved as of 01/31/2022.         Current Assessment & Plan     Wound appears resolved as of 01/31/2022.            GI    Right sided abdominal pain    Overview     Patient complains of pain in the right side of her abdomen that extends up from her back.  She also describes a tightening in her abdomen periodically it is very uncomfortable.  Symptoms are consistent with her prior complaints.  Her colostomy function continues been real good.  CT scan performed 09/27/2021 shows no abnormality to account for the pain..         Current Assessment & Plan     Physical exam of her abdomen shows no abnormality.  The abdomen is soft and actually nontender.  Colostomy in the right upper quadrant has stool and is healthy.  Patient likely has neuropathic pain potentially generated from her spinal cord and spine.  I cannot identify any abnormalities in her abdomen.               See wound doc progress notes. Documents will be scanned.        Carlos Peguero  [facility]

## 2022-02-14 ENCOUNTER — OFFICE VISIT (OUTPATIENT)
Dept: WOUND CARE | Facility: HOSPITAL | Age: 72
End: 2022-02-14
Attending: SURGERY
Payer: MEDICARE

## 2022-02-14 VITALS
HEART RATE: 82 BPM | RESPIRATION RATE: 18 BRPM | DIASTOLIC BLOOD PRESSURE: 68 MMHG | SYSTOLIC BLOOD PRESSURE: 135 MMHG | TEMPERATURE: 98 F

## 2022-02-14 DIAGNOSIS — L89.154 SACRAL DECUBITUS ULCER, STAGE IV: ICD-10-CM

## 2022-02-14 DIAGNOSIS — G82.21 CHRONIC COMPLETE FLACCID PARAPLEGIA: Primary | ICD-10-CM

## 2022-02-14 DIAGNOSIS — R10.9 RIGHT SIDED ABDOMINAL PAIN: ICD-10-CM

## 2022-02-14 DIAGNOSIS — L89.893 PRESSURE ULCER OF TOE OF LEFT FOOT, STAGE 3: ICD-10-CM

## 2022-02-14 DIAGNOSIS — L89.314 DECUBITUS ULCER OF RIGHT ISCHIAL AREA, STAGE IV: ICD-10-CM

## 2022-02-14 DIAGNOSIS — L89.894: ICD-10-CM

## 2022-02-14 PROBLEM — L89.324: Status: RESOLVED | Noted: 2021-06-14 | Resolved: 2022-02-14

## 2022-02-14 PROCEDURE — 99499 UNLISTED E&M SERVICE: CPT | Mod: ,,, | Performed by: SURGERY

## 2022-02-14 PROCEDURE — 27201912 HC WOUND CARE DEBRIDEMENT SUPPLIES

## 2022-02-14 PROCEDURE — 99499 NO LOS: ICD-10-PCS | Mod: ,,, | Performed by: SURGERY

## 2022-02-14 PROCEDURE — 11042 DBRDMT SUBQ TIS 1ST 20SQCM/<: CPT

## 2022-02-14 PROCEDURE — 11045 DBRDMT SUBQ TISS EACH ADDL: CPT

## 2022-02-14 NOTE — ASSESSMENT & PLAN NOTE
Wound continues to progress well.  There is no sign of pressure.  Continue debridement to maintain active phase wound healing.  Continue offloading.

## 2022-02-14 NOTE — ASSESSMENT & PLAN NOTE
Wound is improving.  Continue debridement to maintain active phase wound healing.  Continue offloading.

## 2022-02-14 NOTE — ASSESSMENT & PLAN NOTE
Patient continues to have pain in the right side of her abdomen.  This has been chronic.  Physical exam shows no abnormality.  Her abdomen is very soft without palpable mass.  Colostomy is healthy.  I suspect pain is neuropathic in origin.  I will plan to refer to neurology Dr. Will for evaluation.  Patient is interested in visiting with a neurologist once again.

## 2022-02-14 NOTE — PROGRESS NOTES
[facility]  Wound Care  Progress Note    Problem List Items Addressed This Visit        Neuro    Chronic complete flaccid paraplegia - Primary    Overview     He patient sustained spinal cord infarct resulting and chronic bilateral lower extremity paraplegia.            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  Patient has been bed ridden with prolonged hospital course requiring multiple surgeries.  Patient with numerous pressure ulcers.  Patient recently discharged and receiving home health.  Patient has low loss air mattress at home. Patient says her appetite has been good.           Current Assessment & Plan     Wound continues to progress well.  There is no sign of pressure.  Continue debridement to maintain active phase wound healing.  Continue offloading.         Decubitus ulcer of right ischial area, stage IV    Current Assessment & Plan     Wound is improving.  Continue debridement to maintain active phase wound healing.  Continue offloading.         Decubitus ulcer of right lower extremity, stage IV    Overview     Muscle was exposed on initial evaluation.         Current Assessment & Plan     Wound appears nearly healed.  No debridement performed.         Pressure ulcer of toe of left foot, stage 3    Overview     Patient with longstanding pressure ulcer of the left great toe unable to be staged.  Eschar has been removed to reveal subcutaneous tissue in a stage III ulcer.  Wound appears resolved as of 01/31/2022.  Skin opened with cavity under eschar and drainage on 02/14/2022.         Current Assessment & Plan     Excisional debridement performed of nonviable skin and subcutaneous tissue.  Continue Betadine to the wound.            GI    Right sided abdominal pain    Overview     Patient complains of pain in the right side of her abdomen that extends up from her back.  She also  describes a tightening in her abdomen periodically it is very uncomfortable.  Symptoms are consistent with her prior complaints.  Her colostomy function continues been real good.  CT scan performed 09/27/2021 shows no abnormality to account for the pain..         Current Assessment & Plan     Patient continues to have pain in the right side of her abdomen.  This has been chronic.  Physical exam shows no abnormality.  Her abdomen is very soft without palpable mass.  Colostomy is healthy.  I suspect pain is neuropathic in origin.  I will plan to refer to neurology Dr. Will for evaluation.  Patient is interested in visiting with a neurologist once again.               See wound doc progress notes. Documents will be scanned.        Carlos HAIDER Peguero  [facility]     yes

## 2022-02-14 NOTE — ASSESSMENT & PLAN NOTE
Excisional debridement performed of nonviable skin and subcutaneous tissue.  Continue Betadine to the wound.

## 2022-03-07 ENCOUNTER — OFFICE VISIT (OUTPATIENT)
Dept: WOUND CARE | Facility: HOSPITAL | Age: 72
End: 2022-03-07
Attending: SURGERY
Payer: MEDICARE

## 2022-03-07 VITALS
RESPIRATION RATE: 20 BRPM | HEART RATE: 84 BPM | SYSTOLIC BLOOD PRESSURE: 130 MMHG | DIASTOLIC BLOOD PRESSURE: 76 MMHG | TEMPERATURE: 98 F

## 2022-03-07 DIAGNOSIS — L89.893 PRESSURE ULCER OF TOE OF LEFT FOOT, STAGE 3: ICD-10-CM

## 2022-03-07 DIAGNOSIS — L89.154 SACRAL DECUBITUS ULCER, STAGE IV: Primary | ICD-10-CM

## 2022-03-07 DIAGNOSIS — L89.894: ICD-10-CM

## 2022-03-07 DIAGNOSIS — R10.9 RIGHT SIDED ABDOMINAL PAIN: ICD-10-CM

## 2022-03-07 DIAGNOSIS — L89.314 DECUBITUS ULCER OF RIGHT ISCHIAL AREA, STAGE IV: ICD-10-CM

## 2022-03-07 DIAGNOSIS — G82.21 CHRONIC COMPLETE FLACCID PARAPLEGIA: ICD-10-CM

## 2022-03-07 PROCEDURE — 11042 DBRDMT SUBQ TIS 1ST 20SQCM/<: CPT

## 2022-03-07 PROCEDURE — 99499 NO LOS: ICD-10-PCS | Mod: ,,, | Performed by: SURGERY

## 2022-03-07 PROCEDURE — 99499 UNLISTED E&M SERVICE: CPT | Mod: ,,, | Performed by: SURGERY

## 2022-03-07 PROCEDURE — 27201912 HC WOUND CARE DEBRIDEMENT SUPPLIES

## 2022-03-07 PROCEDURE — 11045 DBRDMT SUBQ TISS EACH ADDL: CPT

## 2022-03-28 ENCOUNTER — OFFICE VISIT (OUTPATIENT)
Dept: WOUND CARE | Facility: HOSPITAL | Age: 72
End: 2022-03-28
Attending: SURGERY
Payer: MEDICARE

## 2022-03-28 VITALS
DIASTOLIC BLOOD PRESSURE: 68 MMHG | TEMPERATURE: 97 F | HEART RATE: 70 BPM | SYSTOLIC BLOOD PRESSURE: 109 MMHG | RESPIRATION RATE: 18 BRPM

## 2022-03-28 DIAGNOSIS — L89.314 DECUBITUS ULCER OF RIGHT ISCHIAL AREA, STAGE IV: ICD-10-CM

## 2022-03-28 DIAGNOSIS — G82.21 CHRONIC COMPLETE FLACCID PARAPLEGIA: ICD-10-CM

## 2022-03-28 DIAGNOSIS — L89.893 PRESSURE ULCER OF TOE OF LEFT FOOT, STAGE 3: ICD-10-CM

## 2022-03-28 DIAGNOSIS — L89.894: ICD-10-CM

## 2022-03-28 DIAGNOSIS — L89.324 DECUBITUS ULCER OF ISCHIUM, LEFT, STAGE IV: Primary | ICD-10-CM

## 2022-03-28 DIAGNOSIS — L89.154 SACRAL DECUBITUS ULCER, STAGE IV: ICD-10-CM

## 2022-03-28 PROCEDURE — 11042 DBRDMT SUBQ TIS 1ST 20SQCM/<: CPT

## 2022-03-28 PROCEDURE — 11043 DBRDMT MUSC&/FSCA 1ST 20/<: CPT

## 2022-03-28 PROCEDURE — 11045 DBRDMT SUBQ TISS EACH ADDL: CPT

## 2022-03-28 PROCEDURE — 99499 NO LOS: ICD-10-PCS | Mod: ,,, | Performed by: SURGERY

## 2022-03-28 PROCEDURE — 27201912 HC WOUND CARE DEBRIDEMENT SUPPLIES

## 2022-03-28 PROCEDURE — 99499 UNLISTED E&M SERVICE: CPT | Mod: ,,, | Performed by: SURGERY

## 2022-03-28 NOTE — ASSESSMENT & PLAN NOTE
Wound is doing well.  There continues to be advancement of the epithelium.  Continue debridement to remove nonviable slough and maintain active phase wound healing.  Continue offloading with low loss air mattress.

## 2022-03-28 NOTE — ASSESSMENT & PLAN NOTE
Wound continues to improve.  Continue debridement to remove nonviable tissue and maintain active phase wound healing.  Continue offloading.

## 2022-03-28 NOTE — ASSESSMENT & PLAN NOTE
Debridement was performed of nonviable tissue.  Continue offloading with low loss air mattress.  A new air mattress will be obtained to maintain offloading for multiple stage IV truncal decubitus.

## 2022-03-28 NOTE — PROGRESS NOTES
[facility]  Wound Care  Progress Note    Problem List Items Addressed This Visit        Neuro    Chronic complete flaccid paraplegia    Overview     He patient sustained spinal cord infarct resulting and chronic bilateral lower extremity paraplegia.              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  Patient has been bed ridden with prolonged hospital course requiring multiple surgeries.  Patient with numerous pressure ulcers.  Patient recently discharged and receiving home health.  Patient has low loss air mattress at home. Patient says her appetite has been good.             Current Assessment & Plan     Wound is doing very well.  There continues to be steady epithelial advancement.  Continue debridement to remove nonviable slough and maintain active phase wound healing.  Continue offloading.  Patient indicates her bed continues with an indention and she is very uncomfortable.  The bed was evaluated by personnel from [email or web address].  Despite some adjustments, there is been no changes according to the patient.  The patient has multiple truncal stage IV decubiti.  She continues to need low loss air mattress that is functioning for offloading.           Decubitus ulcer of right ischial area, stage IV    Current Assessment & Plan     Wound is doing well.  There continues to be advancement of the epithelium.  Continue debridement to remove nonviable slough and maintain active phase wound healing.  Continue offloading with low loss air mattress.           Decubitus ulcer of ischium, left, stage IV - Primary    Overview     Patient presented 03/28/2022 with recurrence of the left ischial decubitus to the bone.  There was a moderate amount of nonviable tissue and drainage.             Current Assessment & Plan     Debridement was performed of nonviable tissue.  Continue offloading with low  loss air mattress.  A new air mattress will be obtained to maintain offloading for multiple stage IV truncal decubitus.           Decubitus ulcer of right lower extremity, stage IV    Overview     Muscle was exposed on initial evaluation.  As of 03/28/2022, the wound is healed.           Current Assessment & Plan     Wound is healed on the right lower extremity.           Pressure ulcer of toe of left foot, stage 3    Overview     Patient with longstanding pressure ulcer of the left great toe unable to be staged.  Eschar has been removed to reveal subcutaneous tissue in a stage III ulcer.  Wound appears resolved as of 01/31/2022.  Skin opened with cavity under eschar and drainage on 02/14/2022.           Current Assessment & Plan     On 03/28/2022, there is no open wound demonstrable.                 See wound doc progress notes. Documents will be scanned.        Carlos HAIDER Peguero  [facility]

## 2022-03-28 NOTE — PROGRESS NOTES
[facility]  Wound Care  Progress Note    Problem List Items Addressed This Visit        Neuro    Chronic complete flaccid paraplegia    Overview     He patient sustained spinal cord infarct resulting and chronic bilateral lower extremity paraplegia.              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  Patient has been bed ridden with prolonged hospital course requiring multiple surgeries.  Patient with numerous pressure ulcers.  Patient recently discharged and receiving home health.  Patient has low loss air mattress at home. Patient says her appetite has been good.             Current Assessment & Plan     Wound is doing extremely well.  Continue debridement to maintain active phase wound healing and remove nonviable slough.  Continue offloading with low loss air mattress.  Patient is concerned about her air mattress.  Will have home health evaluate.           Decubitus ulcer of right ischial area, stage IV    Current Assessment & Plan     Wound continues to improve.  Continue debridement to remove nonviable tissue and maintain active phase wound healing.  Continue offloading.           Decubitus ulcer of right lower extremity, stage IV    Overview     Muscle was exposed on initial evaluation.           Current Assessment & Plan     Wound doing very well.  Near completely healed.  Continue debridement to maintain active phase wound healing.  Continue offloading.           Pressure ulcer of toe of left foot, stage 3    Overview     Patient with longstanding pressure ulcer of the left great toe unable to be staged.  Eschar has been removed to reveal subcutaneous tissue in a stage III ulcer.  Wound appears resolved as of 01/31/2022.  Skin opened with cavity under eschar and drainage on 02/14/2022.           Current Assessment & Plan     Wound is likely healed.  Continue offloading.               GI    Right sided abdominal pain    Overview     Patient complains of pain in the right side of her abdomen that extends up from her back.  She also describes a tightening in her abdomen periodically it is very uncomfortable.  Symptoms are consistent with her prior complaints.  Her colostomy function continues been real good.  CT scan performed 09/27/2021 shows no abnormality to account for the pain..                 See wound doc progress notes. Documents will be scanned.        Carlos HAIDER Peguero  [facility]

## 2022-03-28 NOTE — ASSESSMENT & PLAN NOTE
Wound is doing extremely well.  Continue debridement to maintain active phase wound healing and remove nonviable slough.  Continue offloading with low loss air mattress.  Patient is concerned about her air mattress.  Will have home health evaluate.

## 2022-03-28 NOTE — ASSESSMENT & PLAN NOTE
Wound doing very well.  Near completely healed.  Continue debridement to maintain active phase wound healing.  Continue offloading.

## 2022-03-28 NOTE — ASSESSMENT & PLAN NOTE
Wound is doing very well.  There continues to be steady epithelial advancement.  Continue debridement to remove nonviable slough and maintain active phase wound healing.  Continue offloading.  Patient indicates her bed continues with an indention and she is very uncomfortable.  The bed was evaluated by personnel from [email or web address] [place] [facility].  Despite some adjustments, there is been no changes according to the patient.  The patient has multiple truncal stage IV decubiti.  She continues to need low loss air mattress that is functioning for offloading.

## 2022-04-11 ENCOUNTER — OFFICE VISIT (OUTPATIENT)
Dept: WOUND CARE | Facility: HOSPITAL | Age: 72
End: 2022-04-11
Attending: SURGERY
Payer: MEDICARE

## 2022-04-11 VITALS
HEART RATE: 80 BPM | SYSTOLIC BLOOD PRESSURE: 123 MMHG | TEMPERATURE: 97 F | RESPIRATION RATE: 18 BRPM | DIASTOLIC BLOOD PRESSURE: 78 MMHG

## 2022-04-11 DIAGNOSIS — G82.21 CHRONIC COMPLETE FLACCID PARAPLEGIA: Primary | ICD-10-CM

## 2022-04-11 DIAGNOSIS — L89.314 DECUBITUS ULCER OF RIGHT ISCHIAL AREA, STAGE IV: ICD-10-CM

## 2022-04-11 DIAGNOSIS — L89.154 SACRAL DECUBITUS ULCER, STAGE IV: ICD-10-CM

## 2022-04-11 DIAGNOSIS — L89.324 DECUBITUS ULCER OF ISCHIUM, LEFT, STAGE IV: ICD-10-CM

## 2022-04-11 PROBLEM — L89.893: Status: RESOLVED | Noted: 2021-10-11 | Resolved: 2022-04-11

## 2022-04-11 PROBLEM — L89.894: Status: RESOLVED | Noted: 2021-06-14 | Resolved: 2022-04-11

## 2022-04-11 PROCEDURE — 11042 DBRDMT SUBQ TIS 1ST 20SQCM/<: CPT

## 2022-04-11 PROCEDURE — 99499 UNLISTED E&M SERVICE: CPT | Mod: ,,, | Performed by: SURGERY

## 2022-04-11 PROCEDURE — 99499 NO LOS: ICD-10-PCS | Mod: ,,, | Performed by: SURGERY

## 2022-04-11 PROCEDURE — 27201912 HC WOUND CARE DEBRIDEMENT SUPPLIES

## 2022-04-11 NOTE — ASSESSMENT & PLAN NOTE
Wound is improved.  There is no sign of active pressure.  Continue debridement to remove nonviable tissue and maintain active phase wound healing.  Continue offloading with a low loss air mattress.

## 2022-04-11 NOTE — ASSESSMENT & PLAN NOTE
Wound continues to make excellent progress.  There is no evidence of pressure.  Continue debridement to maintain active phase wound healing.  Continue low loss air mattress.  Patient is awaiting a new mattress delivery.

## 2022-04-11 NOTE — PROGRESS NOTES
[facility]  Wound Care  Progress Note    Problem List Items Addressed This Visit        Neuro    Chronic complete flaccid paraplegia - Primary    Overview     He patient sustained spinal cord infarct resulting and chronic bilateral lower extremity paraplegia.              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  Patient has been bed ridden with prolonged hospital course requiring multiple surgeries.  Patient with numerous pressure ulcers.  Patient recently discharged and receiving home health.  Patient has low loss air mattress at home. Patient says her appetite has been good.             Current Assessment & Plan     Wound continues to make excellent progress.  There is no evidence of pressure.  Continue debridement to maintain active phase wound healing.  Continue low loss air mattress.  Patient is awaiting a new mattress delivery.           Decubitus ulcer of right ischial area, stage IV    Current Assessment & Plan     Wound continues to make steady progress.  There is no sign of active pressure.  Continue debridement to maintain active phase wound healing.  Continue offloading with low loss air mattress.           Decubitus ulcer of ischium, left, stage IV    Overview     Patient presented 03/28/2022 with recurrence of the left ischial decubitus to the bone.  There was a moderate amount of nonviable tissue and drainage.             Current Assessment & Plan     Wound is improved.  There is no sign of active pressure.  Continue debridement to remove nonviable tissue and maintain active phase wound healing.  Continue offloading with a low loss air mattress.                 See wound doc progress notes. Documents will be scanned.        Carlos Peguero  [facility]

## 2022-04-11 NOTE — ASSESSMENT & PLAN NOTE
Wound continues to make steady progress.  There is no sign of active pressure.  Continue debridement to maintain active phase wound healing.  Continue offloading with low loss air mattress.

## 2022-05-09 ENCOUNTER — OFFICE VISIT (OUTPATIENT)
Dept: WOUND CARE | Facility: HOSPITAL | Age: 72
End: 2022-05-09
Attending: SURGERY
Payer: MEDICARE

## 2022-05-09 VITALS
SYSTOLIC BLOOD PRESSURE: 105 MMHG | RESPIRATION RATE: 17 BRPM | TEMPERATURE: 98 F | HEART RATE: 83 BPM | DIASTOLIC BLOOD PRESSURE: 78 MMHG

## 2022-05-09 DIAGNOSIS — R10.9 RIGHT SIDED ABDOMINAL PAIN: ICD-10-CM

## 2022-05-09 DIAGNOSIS — L89.324 DECUBITUS ULCER OF ISCHIUM, LEFT, STAGE IV: ICD-10-CM

## 2022-05-09 DIAGNOSIS — L89.154 SACRAL DECUBITUS ULCER, STAGE IV: ICD-10-CM

## 2022-05-09 DIAGNOSIS — L89.314 DECUBITUS ULCER OF RIGHT ISCHIAL AREA, STAGE IV: ICD-10-CM

## 2022-05-09 PROCEDURE — 99499 UNLISTED E&M SERVICE: CPT | Mod: ,,, | Performed by: SURGERY

## 2022-05-09 PROCEDURE — 99499 NO LOS: ICD-10-PCS | Mod: ,,, | Performed by: SURGERY

## 2022-05-09 PROCEDURE — 27201912 HC WOUND CARE DEBRIDEMENT SUPPLIES

## 2022-05-09 PROCEDURE — 11045 DBRDMT SUBQ TISS EACH ADDL: CPT

## 2022-05-09 PROCEDURE — 11042 DBRDMT SUBQ TIS 1ST 20SQCM/<: CPT

## 2022-05-09 NOTE — ASSESSMENT & PLAN NOTE
Patient did receive her new bed.  She has evidence of pressure most significant on the right ischial region.  Excisional debridement performed of nonviable tissue and to maintain active phase wound healing.  Adjust low loss air mattress bed as necessary to avoid pressure.  Frequent turning.

## 2022-05-09 NOTE — PROGRESS NOTES
[facility]  Wound Care  Progress Note    Problem List Items Addressed This Visit        GI    Right sided abdominal pain    Overview     Patient with long history of right side abdomen pain. Her colostomy function continues been real good.  CT scan performed 09/27/2021 shows no abnormality to account for the pain..           Current Assessment & Plan     Patient complains of bulging on the right side of her abdomen especially when sitting.  She feels like her abdomen is asymmetric.  This likely is related to muscular denervation probably due to her spinal cord injury.  I have suggested utilizing KT tape to realign her abdomen when sitting to prevent the pulling sensation to the right.  Printout example of KT tape was given to the patient.              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  Patient has been bed ridden with prolonged hospital course requiring multiple surgeries.  Patient with numerous pressure ulcers.  Patient recently discharged and receiving home health.  Patient has low loss air mattress at home. Patient says her appetite has been good.             Current Assessment & Plan     Patient did receive her new bed.  She has some evidence of pressure.  Excisional debridement performed of nonviable slough and to maintain active phase wound healing.  Adjust low loss air mattress bed as necessary to avoid pressure.  Frequent turning.           Decubitus ulcer of right ischial area, stage IV    Current Assessment & Plan     Patient did receive her new bed.  She has evidence of pressure most significant on the right ischial region.  Excisional debridement performed of nonviable tissue and to maintain active phase wound healing.  Adjust low loss air mattress bed as necessary to avoid pressure.  Frequent turning.             Decubitus ulcer of ischium, left, stage IV    Overview      Patient presented 03/28/2022 with recurrence of the left ischial decubitus to the bone.  There was a moderate amount of nonviable tissue and drainage.             Current Assessment & Plan     Patient did receive her new bed.  She has no evidence of pressure to this region.  Excisional debridement performed of nonviable slough and to maintain active phase wound healing.  Adjust low loss air mattress bed as necessary to avoid pressure.  Frequent turning.                   See wound doc progress notes. Documents will be scanned.        Carlos Peguero  [facility]

## 2022-05-09 NOTE — ASSESSMENT & PLAN NOTE
Patient did receive her new bed.  She has some evidence of pressure.  Excisional debridement performed of nonviable slough and to maintain active phase wound healing.  Adjust low loss air mattress bed as necessary to avoid pressure.  Frequent turning.

## 2022-05-09 NOTE — ASSESSMENT & PLAN NOTE
Patient did receive her new bed.  She has no evidence of pressure to this region.  Excisional debridement performed of nonviable slough and to maintain active phase wound healing.  Adjust low loss air mattress bed as necessary to avoid pressure.  Frequent turning.

## 2022-05-09 NOTE — ASSESSMENT & PLAN NOTE
Patient complains of bulging on the right side of her abdomen especially when sitting.  She feels like her abdomen is asymmetric.  This likely is related to muscular denervation probably due to her spinal cord injury.  I have suggested utilizing KT tape to realign her abdomen when sitting to prevent the pulling sensation to the right.  Printout example of KT tape was given to the patient.

## 2022-05-23 ENCOUNTER — OFFICE VISIT (OUTPATIENT)
Dept: WOUND CARE | Facility: HOSPITAL | Age: 72
End: 2022-05-23
Attending: SURGERY
Payer: MEDICARE

## 2022-05-23 VITALS
RESPIRATION RATE: 20 BRPM | TEMPERATURE: 98 F | DIASTOLIC BLOOD PRESSURE: 64 MMHG | HEART RATE: 73 BPM | SYSTOLIC BLOOD PRESSURE: 126 MMHG

## 2022-05-23 DIAGNOSIS — L89.324 DECUBITUS ULCER OF ISCHIUM, LEFT, STAGE IV: ICD-10-CM

## 2022-05-23 DIAGNOSIS — L89.314 DECUBITUS ULCER OF RIGHT ISCHIAL AREA, STAGE IV: ICD-10-CM

## 2022-05-23 DIAGNOSIS — L89.154 SACRAL DECUBITUS ULCER, STAGE IV: ICD-10-CM

## 2022-05-23 DIAGNOSIS — R10.9 RIGHT SIDED ABDOMINAL PAIN: ICD-10-CM

## 2022-05-23 DIAGNOSIS — G82.21 CHRONIC COMPLETE FLACCID PARAPLEGIA: ICD-10-CM

## 2022-05-23 PROCEDURE — 27201912 HC WOUND CARE DEBRIDEMENT SUPPLIES

## 2022-05-23 PROCEDURE — 99499 NO LOS: ICD-10-PCS | Mod: ,,, | Performed by: SURGERY

## 2022-05-23 PROCEDURE — 99499 UNLISTED E&M SERVICE: CPT | Mod: ,,, | Performed by: SURGERY

## 2022-05-23 PROCEDURE — 11042 DBRDMT SUBQ TIS 1ST 20SQCM/<: CPT

## 2022-05-23 PROCEDURE — 11045 DBRDMT SUBQ TISS EACH ADDL: CPT

## 2022-05-23 NOTE — ASSESSMENT & PLAN NOTE
Wound is markedly improved with no signs of pressure.  Continue debridement to maintain active phase wound healing and remove nonviable slough.  Continue offloading with frequent turning.
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
## 2022-05-23 NOTE — ASSESSMENT & PLAN NOTE
Wound is markedly improved with no sign of pressure.  Continue debridement to remove nonviable slough and maintain active phase wound healing.  Continue offloading with frequent turning.

## 2022-05-23 NOTE — ASSESSMENT & PLAN NOTE
Wound is doing well with no signs of pressure.  There continues to be epithelial advancement.  Continue debridement to remove nonviable slough and maintain active phase wound healing.  Stressed to the patient the importance of frequent turning to prevent further pressure injury.  She understands she will have to be extremely careful so that pressure injury does not recur.  Any sign of pressure to the wound then return to the low loss air mattress would be necessary.  I am very concerned that she is not on the low loss air mattress but it appears going back at this point will be extremely difficult.

## 2022-05-23 NOTE — ASSESSMENT & PLAN NOTE
I suspect this is primary neurologic in origin related to her spinal cord.  There has never been underlying pathology identified within the abdomen or abdominal wall.  I offered evaluation by Neurology.  See plan above.  Patient is currently seeing pain management.

## 2022-05-23 NOTE — ASSESSMENT & PLAN NOTE
Patient inquired about seeing a neurologist.  She had previously seen Dr. Will at [facility].  I had previously set up an outpatient appointment which the patient did not follow through with.  I have offered to set up an appointment once again.  She will let me know if she desires.

## 2022-06-06 ENCOUNTER — OFFICE VISIT (OUTPATIENT)
Dept: WOUND CARE | Facility: HOSPITAL | Age: 72
End: 2022-06-06
Attending: SURGERY
Payer: MEDICARE

## 2022-06-06 VITALS
HEART RATE: 88 BPM | TEMPERATURE: 98 F | RESPIRATION RATE: 18 BRPM | SYSTOLIC BLOOD PRESSURE: 112 MMHG | DIASTOLIC BLOOD PRESSURE: 75 MMHG

## 2022-06-06 DIAGNOSIS — L89.154 SACRAL DECUBITUS ULCER, STAGE IV: ICD-10-CM

## 2022-06-06 DIAGNOSIS — L89.314 DECUBITUS ULCER OF RIGHT ISCHIAL AREA, STAGE IV: ICD-10-CM

## 2022-06-06 DIAGNOSIS — G82.21 CHRONIC COMPLETE FLACCID PARAPLEGIA: Primary | ICD-10-CM

## 2022-06-06 DIAGNOSIS — L89.324 DECUBITUS ULCER OF ISCHIUM, LEFT, STAGE IV: ICD-10-CM

## 2022-06-06 PROCEDURE — 11042 DBRDMT SUBQ TIS 1ST 20SQCM/<: CPT

## 2022-06-06 PROCEDURE — 99499 NO LOS: ICD-10-PCS | Mod: ,,, | Performed by: SURGERY

## 2022-06-06 PROCEDURE — 99499 UNLISTED E&M SERVICE: CPT | Mod: ,,, | Performed by: SURGERY

## 2022-06-06 NOTE — PROGRESS NOTES
[facility]  Wound Care  Progress Note    Problem List Items Addressed This Visit        Neuro    Chronic complete flaccid paraplegia - Primary    Overview     He patient sustained spinal cord infarct resulting and chronic bilateral lower extremity paraplegia.              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  Patient has been bed ridden with prolonged hospital course requiring multiple surgeries.  Patient with numerous pressure ulcers.  Patient recently discharged and receiving home health.  Patient has low loss air mattress at home. Patient says her appetite has been good.    On the visit of 05/23/2022, the patient indicates she is no longer using her low loss air mattress and is using her regular mattress.  She indicates she is more comfortable.           Current Assessment & Plan     Wound is doing extremely well.  There is no sign of pressure.  Continue debridement to maintain active phase wound healing remove nonviable slough.  Continue offloading.             Decubitus ulcer of right ischial area, stage IV    Current Assessment & Plan     Wound is doing extremely well.  Continue debridement to maintain active phase wound healing remove nonviable slough.  Continue offloading.             Decubitus ulcer of ischium, left, stage IV    Overview     Patient presented 03/28/2022 with recurrence of the left ischial decubitus to the bone.  There was a moderate amount of nonviable tissue and drainage.             Current Assessment & Plan     Wound is doing extremely well.  Continue debridement to maintain active phase wound healing remove nonviable slough.  Continue offloading.                 See wound doc progress notes. Documents will be scanned.        Carlos Peguero  [facility]

## 2022-06-06 NOTE — ASSESSMENT & PLAN NOTE
Wound is doing extremely well.  There is no sign of pressure.  Continue debridement to maintain active phase wound healing remove nonviable slough.  Continue offloading.

## 2022-06-06 NOTE — ASSESSMENT & PLAN NOTE
Wound is doing extremely well.  Continue debridement to maintain active phase wound healing remove nonviable slough.  Continue offloading.

## 2022-06-26 ENCOUNTER — OUTSIDE PLACE OF SERVICE (OUTPATIENT)
Dept: NEPHROLOGY | Facility: CLINIC | Age: 72
End: 2022-06-26
Payer: MEDICARE

## 2022-06-26 ENCOUNTER — OUTSIDE PLACE OF SERVICE (OUTPATIENT)
Dept: NEPHROLOGY | Facility: CLINIC | Age: 72
End: 2022-06-26

## 2022-07-11 ENCOUNTER — OFFICE VISIT (OUTPATIENT)
Dept: WOUND CARE | Facility: HOSPITAL | Age: 72
End: 2022-07-11
Attending: SURGERY
Payer: MEDICARE

## 2022-07-11 VITALS
DIASTOLIC BLOOD PRESSURE: 76 MMHG | HEART RATE: 76 BPM | RESPIRATION RATE: 18 BRPM | TEMPERATURE: 98 F | SYSTOLIC BLOOD PRESSURE: 132 MMHG

## 2022-07-11 DIAGNOSIS — L89.324 DECUBITUS ULCER OF ISCHIUM, LEFT, STAGE IV: ICD-10-CM

## 2022-07-11 DIAGNOSIS — G82.21 CHRONIC COMPLETE FLACCID PARAPLEGIA: Primary | ICD-10-CM

## 2022-07-11 DIAGNOSIS — L89.154 SACRAL DECUBITUS ULCER, STAGE IV: ICD-10-CM

## 2022-07-11 DIAGNOSIS — L89.314 DECUBITUS ULCER OF RIGHT ISCHIAL AREA, STAGE IV: ICD-10-CM

## 2022-07-11 PROCEDURE — 11042 DBRDMT SUBQ TIS 1ST 20SQCM/<: CPT

## 2022-07-11 PROCEDURE — 99499 NO LOS: ICD-10-PCS | Mod: ,,, | Performed by: SURGERY

## 2022-07-11 PROCEDURE — 99499 UNLISTED E&M SERVICE: CPT | Mod: ,,, | Performed by: SURGERY

## 2022-07-11 NOTE — ASSESSMENT & PLAN NOTE
Wound is improved.  Continue debridement to maintain active phase wound healing.  Continue offloading.

## 2022-07-11 NOTE — ASSESSMENT & PLAN NOTE
Wound continues to make steady progress.  Continue debridement to maintain active phase wound healing.  Continue frequent turning for offloading.  Patient did have some evidence of pressure on her heels.  I have asked her to continue to provide pressure relief with pillows under her calves.  There is no evidence of open wound.  For travel, I have asked her use multi Podus boots to protect heels.

## 2022-07-11 NOTE — PROGRESS NOTES
[facility]  Wound Care  Progress Note    Problem List Items Addressed This Visit        Neuro    Chronic complete flaccid paraplegia - Primary    Overview     He patient sustained spinal cord infarct resulting and chronic bilateral lower extremity paraplegia.              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  Patient has been bed ridden with prolonged hospital course requiring multiple surgeries.  Patient with numerous pressure ulcers.  Patient recently discharged and receiving home health.  Patient has low loss air mattress at home. Patient says her appetite has been good.    On the visit of 05/23/2022, the patient indicates she is no longer using her low loss air mattress and is using her regular mattress.  She indicates she is more comfortable.           Current Assessment & Plan     Wound continues to make steady progress.  Continue debridement to maintain active phase wound healing.  Continue frequent turning for offloading.  Patient did have some evidence of pressure on her heels.  I have asked her to continue to provide pressure relief with pillows under her calves.  There is no evidence of open wound.  For travel, I have asked her use multi Podus boots to protect heels.           Decubitus ulcer of right ischial area, stage IV    Current Assessment & Plan     Wound is now healed.  Continue offloading with frequent turning.           Decubitus ulcer of ischium, left, stage IV    Overview     Patient presented 03/28/2022 with recurrence of the left ischial decubitus to the bone.  There was a moderate amount of nonviable tissue and drainage.             Current Assessment & Plan     Wound is improved.  Continue debridement to maintain active phase wound healing.  Continue offloading.                 See wound doc progress notes. Documents will be scanned.        Carlos HAIDER  [facility]

## 2022-10-03 NOTE — ASSESSMENT & PLAN NOTE
Wound is now healed.  Continue offloading with frequent turning.   Writer spoke with JONAS Harvey from Dr. Perry's office. Patient to have breast reduction next Wednesday, 10/12/22. However, patient will need an MRI completed beforehand.     JONAS Harvey reports that Dr. Perry is OK with placing order, too.    Please advise on MRI order for patient. Thank you!    Routed to Dr. Diallo as FYI and for further recommendations re: order.

## 2022-10-10 ENCOUNTER — OFFICE VISIT (OUTPATIENT)
Dept: WOUND CARE | Facility: HOSPITAL | Age: 72
End: 2022-10-10
Attending: SURGERY
Payer: MEDICARE

## 2022-10-10 DIAGNOSIS — L89.324 DECUBITUS ULCER OF ISCHIUM, LEFT, STAGE IV: ICD-10-CM

## 2022-10-10 DIAGNOSIS — G82.21 CHRONIC COMPLETE FLACCID PARAPLEGIA: ICD-10-CM

## 2022-10-10 DIAGNOSIS — T24.211A PARTIAL THICKNESS BURN OF RIGHT THIGH, INITIAL ENCOUNTER: ICD-10-CM

## 2022-10-10 DIAGNOSIS — L89.154 SACRAL DECUBITUS ULCER, STAGE IV: Primary | ICD-10-CM

## 2022-10-10 PROBLEM — L89.314: Status: RESOLVED | Noted: 2021-06-14 | Resolved: 2022-10-10

## 2022-10-10 PROCEDURE — 99214 OFFICE O/P EST MOD 30 MIN: CPT

## 2022-10-10 PROCEDURE — 99499 UNLISTED E&M SERVICE: CPT | Mod: ,,, | Performed by: SURGERY

## 2022-10-10 PROCEDURE — 16020 DRESS/DEBRID P-THICK BURN S: CPT

## 2022-10-10 PROCEDURE — 11042 DBRDMT SUBQ TIS 1ST 20SQCM/<: CPT

## 2022-10-10 PROCEDURE — 99499 NO LOS: ICD-10-PCS | Mod: ,,, | Performed by: SURGERY
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
## 2022-10-10 NOTE — ASSESSMENT & PLAN NOTE
Wound continues to make excellent progress.    Continued debridement to maintain active phase of wound healing.    Continue offloading by frequent turning.
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
## 2022-10-10 NOTE — ASSESSMENT & PLAN NOTE
Wound is making excellent progress.    Continued debridement to maintain active phase of wound healing.    Continue offloading with frequent turning.

## 2022-12-12 ENCOUNTER — OFFICE VISIT (OUTPATIENT)
Dept: WOUND CARE | Facility: HOSPITAL | Age: 72
End: 2022-12-12
Attending: SURGERY
Payer: MEDICARE

## 2022-12-12 VITALS
HEART RATE: 80 BPM | RESPIRATION RATE: 18 BRPM | TEMPERATURE: 98 F | DIASTOLIC BLOOD PRESSURE: 63 MMHG | SYSTOLIC BLOOD PRESSURE: 112 MMHG

## 2022-12-12 DIAGNOSIS — L89.610 UNSTAGEABLE PRESSURE ULCER OF RIGHT HEEL: ICD-10-CM

## 2022-12-12 DIAGNOSIS — G82.21 CHRONIC COMPLETE FLACCID PARAPLEGIA: Primary | ICD-10-CM

## 2022-12-12 DIAGNOSIS — L89.324 DECUBITUS ULCER OF ISCHIUM, LEFT, STAGE IV: ICD-10-CM

## 2022-12-12 DIAGNOSIS — L89.890: ICD-10-CM

## 2022-12-12 DIAGNOSIS — R10.9 RIGHT SIDED ABDOMINAL PAIN: ICD-10-CM

## 2022-12-12 DIAGNOSIS — L89.154 SACRAL DECUBITUS ULCER, STAGE IV: ICD-10-CM

## 2022-12-12 PROBLEM — T24.211A PARTIAL THICKNESS BURN OF RIGHT THIGH: Status: RESOLVED | Noted: 2022-10-10 | Resolved: 2022-12-12

## 2022-12-12 PROCEDURE — 99499 UNLISTED E&M SERVICE: CPT | Mod: ,,, | Performed by: SURGERY

## 2022-12-12 PROCEDURE — 99499 NO LOS: ICD-10-PCS | Mod: ,,, | Performed by: SURGERY

## 2022-12-12 PROCEDURE — 99213 OFFICE O/P EST LOW 20 MIN: CPT | Mod: 25

## 2022-12-12 PROCEDURE — 11042 DBRDMT SUBQ TIS 1ST 20SQCM/<: CPT

## 2022-12-12 NOTE — ASSESSMENT & PLAN NOTE
Patient continues with spasmodic right-sided abdominal pain.  It occurs periodically and unpredictably.  It is moderate to severe in nature.  The patient's stool is firm and pasty.  Digital examination of the ostomy shows a patent ostomy with no evidence of stenosis.    I am suspicious pain is secondary to her right colon with difficulty emptying.  I suspect the pain is colic.  Begin MiraLax daily to soften stool and see if this relieves discomfort.

## 2022-12-12 NOTE — ASSESSMENT & PLAN NOTE
Excisional debridement performed of nonviable tissue into the subcutaneous tissue.    Begin Santyl.    Offloading.  Avoid boots.
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
## 2022-12-12 NOTE — ASSESSMENT & PLAN NOTE
Excisional debridement performed of nonviable tissue to the open area into the subcutaneous tissue.  The complete depth the wound is not identifiable as of yet.    Begin Santyl to open area.    Betadine to eschar.    Avoid pressure.

## 2022-12-12 NOTE — ASSESSMENT & PLAN NOTE
Wound is nearly completely healed.  There was only a tiny area that is not epithelialized.  No debridement was required.    Continue foam dressing.  Continue offloading.

## 2023-01-16 ENCOUNTER — OFFICE VISIT (OUTPATIENT)
Dept: WOUND CARE | Facility: HOSPITAL | Age: 73
End: 2023-01-16
Attending: SURGERY
Payer: MEDICARE

## 2023-01-16 VITALS
SYSTOLIC BLOOD PRESSURE: 116 MMHG | DIASTOLIC BLOOD PRESSURE: 65 MMHG | TEMPERATURE: 98 F | RESPIRATION RATE: 17 BRPM | HEART RATE: 78 BPM

## 2023-01-16 DIAGNOSIS — L89.154 SACRAL DECUBITUS ULCER, STAGE IV: ICD-10-CM

## 2023-01-16 DIAGNOSIS — L89.610 UNSTAGEABLE PRESSURE ULCER OF RIGHT HEEL: ICD-10-CM

## 2023-01-16 DIAGNOSIS — G82.21 CHRONIC COMPLETE FLACCID PARAPLEGIA: ICD-10-CM

## 2023-01-16 DIAGNOSIS — L89.324 DECUBITUS ULCER OF ISCHIUM, LEFT, STAGE IV: Primary | ICD-10-CM

## 2023-01-16 DIAGNOSIS — L89.890: ICD-10-CM

## 2023-01-16 PROCEDURE — 11042 DBRDMT SUBQ TIS 1ST 20SQCM/<: CPT

## 2023-01-16 PROCEDURE — 99499 NO LOS: ICD-10-PCS | Mod: ,,, | Performed by: SURGERY

## 2023-01-16 PROCEDURE — 99499 UNLISTED E&M SERVICE: CPT | Mod: ,,, | Performed by: SURGERY

## 2023-01-24 NOTE — ASSESSMENT & PLAN NOTE
Foam dressing is noted over the remaining area of wound.    There is a small area not requiring debridement with no epithelialization or depth.  The wound is pink without sign of pressure.  Continue foam dressing and offloading.

## 2023-01-24 NOTE — ASSESSMENT & PLAN NOTE
Wound is doing extremely well.  Only a small wound remains with granulation tissue.  Excisional debridement performed of nonviable slough.    Continue offloading.

## 2023-01-24 NOTE — ASSESSMENT & PLAN NOTE
Wound is debrided of necrotic tissue.  The full depth of the wound is still not clear.    Continued debridement to remove nonviable tissue.    Continue offloading.

## 2023-01-24 NOTE — PROGRESS NOTES
[facility]  Wound Care  Progress Note    Problem List Items Addressed This Visit          Neuro    Chronic complete flaccid paraplegia    Overview     He patient sustained spinal cord infarct resulting and chronic bilateral lower extremity paraplegia.            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  Patient has been bed ridden with prolonged hospital course requiring multiple surgeries.  Patient with numerous pressure ulcers.  Patient recently discharged and receiving home health.  Patient has low loss air mattress at home. Patient says her appetite has been good.    On the visit of 05/23/2022, the patient indicates she is no longer using her low loss air mattress and is using her regular mattress.  She indicates she is more comfortable.         Current Assessment & Plan     Foam dressing is noted over the remaining area of wound.    There is a small area not requiring debridement with no epithelialization or depth.  The wound is pink without sign of pressure.  Continue foam dressing and offloading.         Decubitus ulcer of ischium, left, stage IV - Primary    Overview     Patient presented 03/28/2022 with recurrence of the left ischial decubitus to the bone.  There was a moderate amount of nonviable tissue and drainage.         Current Assessment & Plan     Wound is doing extremely well.  Only a small wound remains with granulation tissue.  Excisional debridement performed of nonviable slough.    Continue offloading.         Pressure injury of right calf, unstageable    Overview     Patient sustained a pressure injury to the right calf.  There has been moderate drainage.  Patient is insensate.  The wound is at least into the subcutaneous tissue.  As of 01/16/2023, wound is noted to be a stage III.           Current Assessment & Plan     Debridement not performed.    Continue  offload         Unstageable pressure ulcer of right heel    Overview     Patient developed pressure injury to her right heel secondary to placement of boots.  There is an area laterally dry eschar.  There is open so an open wound medially.  There is modest drainage.         Current Assessment & Plan     Wound is debrided of necrotic tissue.  The full depth of the wound is still not clear.    Continued debridement to remove nonviable tissue.    Continue offloading.            See wound doc progress notes. Documents will be scanned.        Carlos Peguero  [facility]

## 2023-02-13 ENCOUNTER — OFFICE VISIT (OUTPATIENT)
Dept: WOUND CARE | Facility: HOSPITAL | Age: 73
End: 2023-02-13
Attending: SURGERY
Payer: MEDICARE

## 2023-02-13 VITALS
TEMPERATURE: 98 F | RESPIRATION RATE: 18 BRPM | HEART RATE: 78 BPM | DIASTOLIC BLOOD PRESSURE: 63 MMHG | SYSTOLIC BLOOD PRESSURE: 120 MMHG

## 2023-02-13 DIAGNOSIS — L89.324 DECUBITUS ULCER OF ISCHIUM, LEFT, STAGE IV: ICD-10-CM

## 2023-02-13 DIAGNOSIS — G82.21 CHRONIC COMPLETE FLACCID PARAPLEGIA: ICD-10-CM

## 2023-02-13 DIAGNOSIS — L89.614 STAGE IV PRESSURE ULCER OF RIGHT HEEL: Primary | ICD-10-CM

## 2023-02-13 PROCEDURE — 11043 DBRDMT MUSC&/FSCA 1ST 20/<: CPT

## 2023-02-13 PROCEDURE — 99499 UNLISTED E&M SERVICE: CPT | Mod: ,,, | Performed by: SURGERY

## 2023-02-13 PROCEDURE — 3074F PR MOST RECENT SYSTOLIC BLOOD PRESSURE < 130 MM HG: ICD-10-PCS | Mod: CPTII,,, | Performed by: SURGERY

## 2023-02-13 PROCEDURE — 3074F SYST BP LT 130 MM HG: CPT | Mod: CPTII,,, | Performed by: SURGERY

## 2023-02-13 PROCEDURE — 11042 DBRDMT SUBQ TIS 1ST 20SQCM/<: CPT

## 2023-02-13 PROCEDURE — 3078F DIAST BP <80 MM HG: CPT | Mod: CPTII,,, | Performed by: SURGERY

## 2023-02-13 PROCEDURE — 3078F PR MOST RECENT DIASTOLIC BLOOD PRESSURE < 80 MM HG: ICD-10-PCS | Mod: CPTII,,, | Performed by: SURGERY

## 2023-02-13 PROCEDURE — 99499 NO LOS: ICD-10-PCS | Mod: ,,, | Performed by: SURGERY

## 2023-02-27 PROBLEM — L89.614 STAGE IV PRESSURE ULCER OF RIGHT HEEL: Status: ACTIVE | Noted: 2022-12-12

## 2023-02-27 NOTE — PROGRESS NOTES
[facility]  Wound Care  Progress Note    Problem List Items Addressed This Visit          Neuro    Chronic complete flaccid paraplegia    Overview     He patient sustained spinal cord infarct resulting and chronic bilateral lower extremity paraplegia.            Orthopedic    Decubitus ulcer of ischium, left, stage IV    Overview     Patient presented 03/28/2022 with recurrence of the left ischial decubitus to the bone.  There was a moderate amount of nonviable tissue and drainage.         Current Assessment & Plan     Wound continues to make excellent progress.  Wound is nearly healed.    Excisional debridement performed to remove nonviable tissue.    Continue offloading.         Stage IV pressure ulcer of right heel - Primary    Overview     Patient developed pressure injury to her right heel secondary to placement of boots.  There is an area laterally dry eschar.  There is open so an open wound medially.  There is modest drainage.  As of 213th 2023, the wound was clearly a stage IV decubitus.         Current Assessment & Plan     Excisional debridement performed of necrotic tissue.    Continue Santyl.    Continue offloading.            See wound doc progress notes. Documents will be scanned.        Carlos Peguero  [facility]

## 2023-02-27 NOTE — ASSESSMENT & PLAN NOTE
Wound continues to make excellent progress.  Wound is nearly healed.    Excisional debridement performed to remove nonviable tissue.    Continue offloading.

## 2023-03-20 ENCOUNTER — OFFICE VISIT (OUTPATIENT)
Dept: WOUND CARE | Facility: HOSPITAL | Age: 73
End: 2023-03-20
Attending: SURGERY
Payer: MEDICARE

## 2023-03-20 VITALS
SYSTOLIC BLOOD PRESSURE: 118 MMHG | DIASTOLIC BLOOD PRESSURE: 66 MMHG | RESPIRATION RATE: 18 BRPM | HEART RATE: 77 BPM | TEMPERATURE: 98 F

## 2023-03-20 DIAGNOSIS — L89.890: ICD-10-CM

## 2023-03-20 DIAGNOSIS — G82.21 CHRONIC COMPLETE FLACCID PARAPLEGIA: Primary | ICD-10-CM

## 2023-03-20 DIAGNOSIS — L89.324 DECUBITUS ULCER OF ISCHIUM, LEFT, STAGE IV: ICD-10-CM

## 2023-03-20 DIAGNOSIS — L89.154 SACRAL DECUBITUS ULCER, STAGE IV: ICD-10-CM

## 2023-03-20 DIAGNOSIS — L89.614 STAGE IV PRESSURE ULCER OF RIGHT HEEL: ICD-10-CM

## 2023-03-20 PROCEDURE — 1159F MED LIST DOCD IN RCRD: CPT | Mod: CPTII,,, | Performed by: SURGERY

## 2023-03-20 PROCEDURE — 3078F DIAST BP <80 MM HG: CPT | Mod: CPTII,,, | Performed by: SURGERY

## 2023-03-20 PROCEDURE — 99499 UNLISTED E&M SERVICE: CPT | Mod: ,,, | Performed by: SURGERY

## 2023-03-20 PROCEDURE — 1159F PR MEDICATION LIST DOCUMENTED IN MEDICAL RECORD: ICD-10-PCS | Mod: CPTII,,, | Performed by: SURGERY

## 2023-03-20 PROCEDURE — 99499 NO LOS: ICD-10-PCS | Mod: ,,, | Performed by: SURGERY

## 2023-03-20 PROCEDURE — 3074F SYST BP LT 130 MM HG: CPT | Mod: CPTII,,, | Performed by: SURGERY

## 2023-03-20 PROCEDURE — 3078F PR MOST RECENT DIASTOLIC BLOOD PRESSURE < 80 MM HG: ICD-10-PCS | Mod: CPTII,,, | Performed by: SURGERY

## 2023-03-20 PROCEDURE — 3074F PR MOST RECENT SYSTOLIC BLOOD PRESSURE < 130 MM HG: ICD-10-PCS | Mod: CPTII,,, | Performed by: SURGERY

## 2023-03-20 PROCEDURE — 11042 DBRDMT SUBQ TIS 1ST 20SQCM/<: CPT

## 2023-03-20 NOTE — ASSESSMENT & PLAN NOTE
Wound is markedly improved.  There is no longer black eschar.    Continue Santyl.    Continue offloading heel.

## 2023-03-20 NOTE — PROGRESS NOTES
[facility]  Wound Care  Progress Note    Problem List Items Addressed This Visit          Neuro    Chronic complete flaccid paraplegia - Primary    Overview     He patient sustained spinal cord infarct resulting and chronic bilateral lower extremity paraplegia.            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  Patient has been bed ridden with prolonged hospital course requiring multiple surgeries.  Patient with numerous pressure ulcers.  Patient recently discharged and receiving home health.  Patient has low loss air mattress at home. Patient says her appetite has been good.    On the visit of 05/23/2022, the patient indicates she is no longer using her low loss air mattress and is using her regular mattress.  She indicates she is more comfortable.  On 3/20/2023, the wound is completely healed.         Current Assessment & Plan     Continue offloading         Decubitus ulcer of ischium, left, stage IV    Overview     Patient presented 03/28/2022 with recurrence of the left ischial decubitus to the bone.  There was a moderate amount of nonviable tissue and drainage.  As of 03/20/2023, the wound is healed.         Current Assessment & Plan     Wound is healed.    Continue offloading.         Pressure injury of right calf, unstageable    Overview     Patient sustained a pressure injury to the right calf.  There has been moderate drainage.  Patient is insensate.  The wound is at least into the subcutaneous tissue.  As of 01/16/2023, wound is noted to be a stage III.    The wound is healed as 03/20/2023.         Current Assessment & Plan     Wound is healed.  Continue offloading.         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  There is an area laterally dry eschar.  There is open so an open  wound medially.  There is modest drainage.  As of 213th 2023, the wound was clearly a stage IV decubitus.         Current Assessment & Plan     Wound is markedly improved.  There is no longer black eschar.    Continue Santyl.    Continue offloading heel.            See wound doc progress notes. Documents will be scanned.        Carlos HAIDER Hebert [facility]

## 2023-10-10 NOTE — PROGRESS NOTES
.a     Tazorac Pregnancy And Lactation Text: This medication is not safe during pregnancy. It is unknown if this medication is excreted in breast milk.